# Patient Record
Sex: MALE | Race: WHITE | NOT HISPANIC OR LATINO | URBAN - METROPOLITAN AREA
[De-identification: names, ages, dates, MRNs, and addresses within clinical notes are randomized per-mention and may not be internally consistent; named-entity substitution may affect disease eponyms.]

---

## 2019-04-26 ENCOUNTER — EMERGENCY (EMERGENCY)
Facility: HOSPITAL | Age: 29
LOS: 0 days | Discharge: HOME | End: 2019-04-26
Attending: EMERGENCY MEDICINE | Admitting: EMERGENCY MEDICINE
Payer: SUBSIDIZED

## 2019-04-26 VITALS
HEART RATE: 87 BPM | DIASTOLIC BLOOD PRESSURE: 80 MMHG | OXYGEN SATURATION: 98 % | SYSTOLIC BLOOD PRESSURE: 123 MMHG | RESPIRATION RATE: 17 BRPM | TEMPERATURE: 98 F | WEIGHT: 198.42 LBS | HEIGHT: 74.41 IN

## 2019-04-26 DIAGNOSIS — R10.13 EPIGASTRIC PAIN: ICD-10-CM

## 2019-04-26 DIAGNOSIS — E86.0 DEHYDRATION: ICD-10-CM

## 2019-04-26 DIAGNOSIS — R11.2 NAUSEA WITH VOMITING, UNSPECIFIED: ICD-10-CM

## 2019-04-26 DIAGNOSIS — R68.83 CHILLS (WITHOUT FEVER): ICD-10-CM

## 2019-04-26 DIAGNOSIS — R11.10 VOMITING, UNSPECIFIED: ICD-10-CM

## 2019-04-26 LAB
ALBUMIN SERPL ELPH-MCNC: 4.8 G/DL — SIGNIFICANT CHANGE UP (ref 3.5–5.2)
ALP SERPL-CCNC: 61 U/L — SIGNIFICANT CHANGE UP (ref 30–115)
ALT FLD-CCNC: 47 U/L — HIGH (ref 0–41)
ANION GAP SERPL CALC-SCNC: 15 MMOL/L — HIGH (ref 7–14)
APPEARANCE UR: CLEAR — SIGNIFICANT CHANGE UP
AST SERPL-CCNC: 30 U/L — SIGNIFICANT CHANGE UP (ref 0–41)
BASE EXCESS BLDV CALC-SCNC: 0.6 MMOL/L — SIGNIFICANT CHANGE UP (ref -2–2)
BASOPHILS # BLD AUTO: 0.02 K/UL — SIGNIFICANT CHANGE UP (ref 0–0.2)
BASOPHILS NFR BLD AUTO: 0.2 % — SIGNIFICANT CHANGE UP (ref 0–1)
BILIRUB DIRECT SERPL-MCNC: <0.2 MG/DL — SIGNIFICANT CHANGE UP (ref 0–0.2)
BILIRUB INDIRECT FLD-MCNC: >0.8 MG/DL — SIGNIFICANT CHANGE UP (ref 0.2–1.2)
BILIRUB SERPL-MCNC: 1 MG/DL — SIGNIFICANT CHANGE UP (ref 0.2–1.2)
BILIRUB UR-MCNC: NEGATIVE — SIGNIFICANT CHANGE UP
BUN SERPL-MCNC: 17 MG/DL — SIGNIFICANT CHANGE UP (ref 10–20)
CA-I SERPL-SCNC: 1.15 MMOL/L — SIGNIFICANT CHANGE UP (ref 1.12–1.3)
CALCIUM SERPL-MCNC: 9.4 MG/DL — SIGNIFICANT CHANGE UP (ref 8.5–10.1)
CHLORIDE SERPL-SCNC: 101 MMOL/L — SIGNIFICANT CHANGE UP (ref 98–110)
CO2 SERPL-SCNC: 26 MMOL/L — SIGNIFICANT CHANGE UP (ref 17–32)
COLOR SPEC: YELLOW — SIGNIFICANT CHANGE UP
CREAT SERPL-MCNC: 1.2 MG/DL — SIGNIFICANT CHANGE UP (ref 0.7–1.5)
DIFF PNL FLD: ABNORMAL
EOSINOPHIL # BLD AUTO: 0.05 K/UL — SIGNIFICANT CHANGE UP (ref 0–0.7)
EOSINOPHIL NFR BLD AUTO: 0.5 % — SIGNIFICANT CHANGE UP (ref 0–8)
GAS PNL BLDV: 141 MMOL/L — SIGNIFICANT CHANGE UP (ref 136–145)
GAS PNL BLDV: SIGNIFICANT CHANGE UP
GLUCOSE SERPL-MCNC: 134 MG/DL — HIGH (ref 70–99)
GLUCOSE UR QL: NEGATIVE MG/DL — SIGNIFICANT CHANGE UP
HCO3 BLDV-SCNC: 27 MMOL/L — SIGNIFICANT CHANGE UP (ref 22–29)
HCT VFR BLD CALC: 43.4 % — SIGNIFICANT CHANGE UP (ref 42–52)
HCT VFR BLDA CALC: 52.8 % — HIGH (ref 34–44)
HGB BLD CALC-MCNC: 17.2 G/DL — SIGNIFICANT CHANGE UP (ref 14–18)
HGB BLD-MCNC: 14.7 G/DL — SIGNIFICANT CHANGE UP (ref 14–18)
HOROWITZ INDEX BLDV+IHG-RTO: 21 — SIGNIFICANT CHANGE UP
IMM GRANULOCYTES NFR BLD AUTO: 0.5 % — HIGH (ref 0.1–0.3)
KETONES UR-MCNC: ABNORMAL
LACTATE BLDV-MCNC: 1.3 MMOL/L — SIGNIFICANT CHANGE UP (ref 0.5–1.6)
LACTATE SERPL-SCNC: 2.4 MMOL/L — HIGH (ref 0.5–2.2)
LEUKOCYTE ESTERASE UR-ACNC: NEGATIVE — SIGNIFICANT CHANGE UP
LIDOCAIN IGE QN: 36 U/L — SIGNIFICANT CHANGE UP (ref 7–60)
LYMPHOCYTES # BLD AUTO: 1.27 K/UL — SIGNIFICANT CHANGE UP (ref 1.2–3.4)
LYMPHOCYTES # BLD AUTO: 12.2 % — LOW (ref 20.5–51.1)
MCHC RBC-ENTMCNC: 30.9 PG — SIGNIFICANT CHANGE UP (ref 27–31)
MCHC RBC-ENTMCNC: 33.9 G/DL — SIGNIFICANT CHANGE UP (ref 32–37)
MCV RBC AUTO: 91.4 FL — SIGNIFICANT CHANGE UP (ref 80–94)
MONOCYTES # BLD AUTO: 0.66 K/UL — HIGH (ref 0.1–0.6)
MONOCYTES NFR BLD AUTO: 6.3 % — SIGNIFICANT CHANGE UP (ref 1.7–9.3)
NEUTROPHILS # BLD AUTO: 8.4 K/UL — HIGH (ref 1.4–6.5)
NEUTROPHILS NFR BLD AUTO: 80.3 % — HIGH (ref 42.2–75.2)
NITRITE UR-MCNC: NEGATIVE — SIGNIFICANT CHANGE UP
NRBC # BLD: 0 /100 WBCS — SIGNIFICANT CHANGE UP (ref 0–0)
PCO2 BLDV: 47 MMHG — SIGNIFICANT CHANGE UP (ref 41–51)
PH BLDV: 7.36 — SIGNIFICANT CHANGE UP (ref 7.26–7.43)
PH UR: 6 — SIGNIFICANT CHANGE UP (ref 5–8)
PLATELET # BLD AUTO: 200 K/UL — SIGNIFICANT CHANGE UP (ref 130–400)
PO2 BLDV: 47 MMHG — HIGH (ref 20–40)
POTASSIUM BLDV-SCNC: 3.8 MMOL/L — SIGNIFICANT CHANGE UP (ref 3.3–5.6)
POTASSIUM SERPL-MCNC: 3.7 MMOL/L — SIGNIFICANT CHANGE UP (ref 3.5–5)
POTASSIUM SERPL-SCNC: 3.7 MMOL/L — SIGNIFICANT CHANGE UP (ref 3.5–5)
PROT SERPL-MCNC: 7.5 G/DL — SIGNIFICANT CHANGE UP (ref 6–8)
PROT UR-MCNC: ABNORMAL MG/DL
RBC # BLD: 4.75 M/UL — SIGNIFICANT CHANGE UP (ref 4.7–6.1)
RBC # FLD: 11.4 % — LOW (ref 11.5–14.5)
SAO2 % BLDV: 84 % — SIGNIFICANT CHANGE UP
SODIUM SERPL-SCNC: 142 MMOL/L — SIGNIFICANT CHANGE UP (ref 135–146)
SP GR SPEC: 1.02 — SIGNIFICANT CHANGE UP (ref 1.01–1.03)
UROBILINOGEN FLD QL: 0.2 MG/DL — SIGNIFICANT CHANGE UP (ref 0.2–0.2)
WBC # BLD: 10.45 K/UL — SIGNIFICANT CHANGE UP (ref 4.8–10.8)
WBC # FLD AUTO: 10.45 K/UL — SIGNIFICANT CHANGE UP (ref 4.8–10.8)

## 2019-04-26 PROCEDURE — 71046 X-RAY EXAM CHEST 2 VIEWS: CPT | Mod: 26

## 2019-04-26 PROCEDURE — 99284 EMERGENCY DEPT VISIT MOD MDM: CPT

## 2019-04-26 RX ORDER — SODIUM CHLORIDE 9 MG/ML
2000 INJECTION INTRAMUSCULAR; INTRAVENOUS; SUBCUTANEOUS ONCE
Qty: 0 | Refills: 0 | Status: COMPLETED | OUTPATIENT
Start: 2019-04-26 | End: 2019-04-26

## 2019-04-26 RX ORDER — ONDANSETRON 8 MG/1
1 TABLET, FILM COATED ORAL
Qty: 9 | Refills: 0 | OUTPATIENT
Start: 2019-04-26 | End: 2019-04-28

## 2019-04-26 RX ORDER — ONDANSETRON 8 MG/1
4 TABLET, FILM COATED ORAL ONCE
Qty: 0 | Refills: 0 | Status: COMPLETED | OUTPATIENT
Start: 2019-04-26 | End: 2019-04-26

## 2019-04-26 RX ORDER — FAMOTIDINE 10 MG/ML
20 INJECTION INTRAVENOUS ONCE
Qty: 0 | Refills: 0 | Status: COMPLETED | OUTPATIENT
Start: 2019-04-26 | End: 2019-04-26

## 2019-04-26 RX ADMIN — FAMOTIDINE 20 MILLIGRAM(S): 10 INJECTION INTRAVENOUS at 21:22

## 2019-04-26 RX ADMIN — SODIUM CHLORIDE 2000 MILLILITER(S): 9 INJECTION INTRAMUSCULAR; INTRAVENOUS; SUBCUTANEOUS at 21:25

## 2019-04-26 RX ADMIN — ONDANSETRON 4 MILLIGRAM(S): 8 TABLET, FILM COATED ORAL at 21:21

## 2019-04-26 RX ADMIN — ONDANSETRON 4 MILLIGRAM(S): 8 TABLET, FILM COATED ORAL at 22:49

## 2019-04-26 NOTE — ED PROVIDER NOTE - NS ED ROS FT
Constitutional: + chills, body aches. no fever, no recent weight loss, change in appetite or malaise  Cardiac: No chest pain, SOB or edema.  Respiratory: No cough or respiratory distress  GI: + n/v/epigastric discomfort. No diarrhea/rectal bleeding  : No dysuria, frequency, urgency or hematuria  MS: no pain to back or extremities, no loss of ROM, no weakness  Neuro: No headache or weakness. No LOC.  Skin: No skin rash.  Except as documented in the HPI, all other systems are negative.

## 2019-04-26 NOTE — ED ADULT NURSE NOTE - NSIMPLEMENTINTERV_GEN_ALL_ED
Implemented All Universal Safety Interventions:  [place] to call system. Call bell, personal items and telephone within reach. Instruct patient to call for assistance. Room bathroom lighting operational. Non-slip footwear when patient is off stretcher. Physically safe environment: no spills, clutter or unnecessary equipment. Stretcher in lowest position, wheels locked, appropriate side rails in place.

## 2019-04-26 NOTE — ED ADULT TRIAGE NOTE - CHIEF COMPLAINT QUOTE
As per patient "I started vomiting today and my stomach hurts here (epigastric) and my whole body feels tired and no strength".

## 2019-04-26 NOTE — ED PROVIDER NOTE - CLINICAL SUMMARY MEDICAL DECISION MAKING FREE TEXT BOX
28m w vomiting and mild abdominal discomfort. nontoxic appearing, n/v intact, no abd tender. mild dehydration. Labs & imaging reviewed. Analgesia, antiemetics, & IV fluids given with improvement in symptoms. Pt tolerating PO intake in ED. Pt advised regarding symptomatic/supportive care, importance of PMD f/u, and symptoms to prompt ED return. Copy of results given to patient.

## 2019-04-26 NOTE — ED PROVIDER NOTE - ATTENDING CONTRIBUTION TO CARE
28m w no PMH reports a few hours of epigastric discomfort and vomiting (no blood/bile). Discomfort is moderate, constant, no radiating, no exacerbating/alleviating. Pt thinks he might have eaten bad yogurt just prior. No diarrhea, no black/bloody stools. No recent travel/hosp/abx/sick contacts.     Pt offered  services but prefers family to translate.     Review of Systems  Constitutional:  No fever or chills.   Eyes:  Negative.   ENMT:  No nasal congestion, discharge, or throat pain.   Cardiac:  No chest pain, syncope, or edema.  Respiratory:  No dyspnea, wheezing, or cough. No hemoptysis.  GI:  See HPI.  :  No dysuria or hematuria.   Musculoskeletal:  No joint swelling, joint pain, or back pain.  Skin:  No skin rash, jaundice, or lesions.  Neuro:  No headache, loss of sensation, or focal weakness.  No change in mental status.     Physical Exam  General: Awake, alert, NAD, WDWN, non-toxic appearing, NCAT  Eyes: PERRL, EOMI, no icterus, lids and conjunctivae are normal  ENT: External inspection normal, pink/dry membranes, pharynx normal  CV: S1S2, regular rate and rhythm, no murmur/gallops/rubs, no JVD, 2+ pulses b/l, no edema/cords/homans, warm/well-perfused  Respiratory: Normal respiratory rate/effort, no respiratory distress, normal voice, speaking full sentences, lungs clear to auscultation b/l, no wheezing/rales/rhonchi, no retractions, no stridor  Abdomen: Soft abdomen, no tender/distended/guarding/rebound, no CVA tender, neg Mckeon  Musculoskeletal: FROM all 4 extremities, N/V intact  Neck: FROM neck, supple, no meningismus, trachea midline, no JVD  Integumentary: Color normal for race, warm and dry, no rash  Neuro: Oriented x3, CN 2-12 grossly intact, normal motor, normal sensory  Psych: Oriented x3, mood normal, affect normal     28m w vomiting and mild abdominal discomfort. nontoxic appearing, n/v intact, no abd tender. mild dehydration. --Labs, IV fluids, Analgesia/antiemetics as needed, observe/re-assess.

## 2019-04-26 NOTE — ED PROVIDER NOTE - PHYSICAL EXAMINATION
CONSTITUTIONAL: Well-appearing; well-nourished; in no apparent distress.   NECK: Supple; non-tender; no cervical lymphadenopathy.    CARDIOVASCULAR: Normal S1, S2; no murmurs, rubs, or gallops.   RESPIRATORY: Normal chest excursion with respiration; breath sounds clear and equal bilaterally; no wheezes, rhonchi, or rales.  GI/: Normal bowel sounds; non-distended; non-tender; negative murphys sign. No ttp at mcburney's point. No CVA ttp. no palpable organomegaly.   MS: No evidence of trauma or deformity. Normal ROM in all four extremities; non-tender to palpation; distal pulses are normal.   SKIN: Normal for age and race; warm; dry; good turgor; no apparent lesions or exudate.   NEURO/PSYCH: A & O x 4; grossly unremarkable. mood and manner are appropriate. Grooming and personal hygiene are appropriate.

## 2019-04-26 NOTE — ED PROVIDER NOTE - NSFOLLOWUPINSTRUCTIONS_ED_ALL_ED_FT
Please follow up with your primary care doctor within one week. Please follow up with your primary care doctor within one week.    Acute Nausea and Vomiting    WHAT YOU NEED TO KNOW:    Acute nausea and vomiting start suddenly, worsen quickly, and last a short time.    DISCHARGE INSTRUCTIONS:    Return to the emergency department if:     You see blood in your vomit or your bowel movements.      You have sudden, severe pain in your chest and upper abdomen after hard vomiting or retching.      You have swelling in your neck and chest.       You are dizzy, cold, and thirsty and your eyes and mouth are dry.      You are urinating very little or not at all.      You have muscle weakness, leg cramps, and trouble breathing.       Your heart is beating much faster than normal.       You continue to vomit for more than 48 hours.     Contact your healthcare provider if:     You have frequent dry heaves (vomiting but nothing comes out).      Your nausea and vomiting does not get better or go away after you use medicine.      You have questions or concerns about your condition or treatment.    Medicines: You may need any of the following:     Medicines may be given to calm your stomach and stop your vomiting. You may also need medicines to help you feel more relaxed or to stop nausea and vomiting caused by motion sickness.      Gastrointestinal stimulants are used to help empty your stomach and bowels. This may help decrease nausea and vomiting.      Take your medicine as directed. Contact your healthcare provider if you think your medicine is not helping or if you have side effects. Tell him or her if you are allergic to any medicine. Keep a list of the medicines, vitamins, and herbs you take. Include the amounts, and when and why you take them. Bring the list or the pill bottles to follow-up visits. Carry your medicine list with you in case of an emergency.    Prevent or manage acute nausea and vomiting:     Do not drink alcohol. Alcohol may upset or irritate your stomach. Too much alcohol can also cause acute nausea and vomiting.      Control stress. Headaches due to stress may cause nausea and vomiting. Find ways to relax and manage your stress. Get more rest and sleep.      Drink more liquids as directed. Vomiting can lead to dehydration. It is important to drink more liquids to help replace lost body fluids. Ask your healthcare provider how much liquid to drink each day and which liquids are best for you. Your provider may recommend that you drink an oral rehydration solution (ORS). ORS contains water, salts, and sugar that are needed to replace the lost body fluids. Ask what kind of ORS to use, how much to drink, and where to get it.      Eat smaller meals, more often. Eat small amounts of food every 2 to 3 hours, even if you are not hungry. Food in your stomach may decrease your nausea.      Talk to your healthcare provider before you take over-the-counter (OTC) medicines. These medicines can cause serious problems if you use certain other medicines, or you have a medical condition. You may have problems if you use too much or use them for longer than the label says. Follow directions on the label carefully.

## 2019-04-26 NOTE — ED PROVIDER NOTE - OBJECTIVE STATEMENT
28 year old Male with no pmhx c/o n/v that began at 7:30pm today. Pt has had 3 episodes of non bloody non bilious vomiting. + epigastric discomfort. Pt had normal bowel movement today. + chills and body aches. No fever, sick contacts, recent travel 28 year old Male with no pmhx c/o n/v that began at 7:30pm today. Pt has had 3 episodes of non bloody non bilious vomiting. + epigastric discomfort. Pt had normal bowel movement today. + chills and body aches. Pt sts he feels like he ate spoiled yogurt earlier today. No fever, sick contacts, recent travel, urinary symptoms, chest pain, past abdominal surgeries.

## 2019-04-26 NOTE — ED PROVIDER NOTE - NSFOLLOWUPCLINICS_GEN_ALL_ED_FT
A Family Medicine Doctor  Family Medicine  .  [place]   Phone:   Fax:   Follow Up Time: 1-3 Days    [address]   Phone: [phone]  Fax:   Follow Up Time: 1-3 Days

## 2019-04-27 VITALS
DIASTOLIC BLOOD PRESSURE: 82 MMHG | SYSTOLIC BLOOD PRESSURE: 126 MMHG | HEART RATE: 86 BPM | OXYGEN SATURATION: 99 % | TEMPERATURE: 98 F | RESPIRATION RATE: 18 BRPM

## 2019-04-27 LAB
BACTERIA # UR AUTO: ABNORMAL
COD CRY URNS QL: NEGATIVE — SIGNIFICANT CHANGE UP
COMMENT - URINE: SIGNIFICANT CHANGE UP
EPI CELLS # UR: ABNORMAL /HPF
GRAN CASTS # UR COMP ASSIST: NEGATIVE — SIGNIFICANT CHANGE UP
HYALINE CASTS # UR AUTO: NEGATIVE — SIGNIFICANT CHANGE UP
RBC CASTS # UR COMP ASSIST: ABNORMAL /HPF
TRI-PHOS CRY UR QL COMP ASSIST: NEGATIVE — SIGNIFICANT CHANGE UP
URATE CRY FLD QL MICRO: NEGATIVE — SIGNIFICANT CHANGE UP
WBC UR QL: SIGNIFICANT CHANGE UP /HPF

## 2019-04-27 RX ORDER — ONDANSETRON 8 MG/1
4 TABLET, FILM COATED ORAL ONCE
Qty: 0 | Refills: 0 | Status: COMPLETED | OUTPATIENT
Start: 2019-04-27 | End: 2019-04-27

## 2019-04-27 RX ADMIN — ONDANSETRON 4 MILLIGRAM(S): 8 TABLET, FILM COATED ORAL at 00:17
